# Patient Record
(demographics unavailable — no encounter records)

---

## 2025-07-14 NOTE — HISTORY OF PRESENT ILLNESS
[de-identified] : 26-year-old female presents to me with neck pain with first Cytra-3 September 2024 she is having neck pain radiating down her left arm patient is prescribed physical therapy.  Since then she has done a home rehab program.  She has really worked hard.  She has taken anti-inflammatories.  She has relieved her arm pain however she still has significant neck pain.  It is radiating up with her head as well.  She like the MRI of her cervical spine because the pain persist.

## 2025-07-14 NOTE — DISCUSSION/SUMMARY
[de-identified] : 26-year-old female presents to me with cervical pain.  I ordered an MRI of the cervical spine and see her back in this complete.  She has failed conservative care.

## 2025-07-14 NOTE — IMAGING
[de-identified] : Diminished range of motion of the flexion-extension rotation of the cervical spine with exacerbation of the pain with significant right-sided rotation.